# Patient Record
Sex: MALE | URBAN - METROPOLITAN AREA
[De-identification: names, ages, dates, MRNs, and addresses within clinical notes are randomized per-mention and may not be internally consistent; named-entity substitution may affect disease eponyms.]

---

## 2017-02-01 NOTE — PATIENT DISCUSSION
1.  Transplant Doing well after transplant. Continue topical steroids. Rejection and vaccinations reviewed. 2. Pseudophakia OU - IOLs stable. Monitor. 3. ARMD OU dry - Importance of smoking cessation blood pressure control and healthy diet were emphasized. In accordance with the AREDS study a good multivitamin containing EC and Zinc were recommened to be taken daily. Patient was instructed to self monitor their monocular vision (reading/Amsler Grid) at least weekly. Patient should immediately report any new onset of decreased vision or metamorphopsia. 4. Return for an appointment in 9 months for comprehensive exam. with Dr. Monty Frazier.

## 2018-02-13 NOTE — PATIENT DISCUSSION
1.  ARMD OU dry - Importance of smoking cessation blood pressure control and healthy diet were emphasized. In accordance with the AREDS study a good multivitamin containing EC and Zinc were recommened to be taken daily. Patient was instructed to self monitor their monocular vision (reading/Amsler Grid) at least weekly. Patient should immediately report any new onset of decreased vision or metamorphopsia. 2. Pseudophakia OU - IOLs stable. Monitor. 3. Transplant Doing well after DSEK OU. Continue PF QD. Rejection and vaccinations reviewed. 4. Dry Eye OU:  Continue current management with Restasis. 5.  Refractive error - Change glasses. 6.  Return for an appointment in 6 months for office call and pressure check. Optos with Dr. Alana Winters.

## 2018-09-14 NOTE — PATIENT DISCUSSION
1.  Transplant Doing well after transplant. Continue topical steroids. Rejection and vaccinations reviewed. 2. Pseudophakia OU - IOLs stable. Monitor. 3. ARMD OU dry - Importance of smoking cessation blood pressure control and healthy diet were emphasized. In accordance with the AREDS study a good multivitamin containing EC and Zinc were recommened to be taken daily. Patient was instructed to self monitor their monocular vision (reading/Amsler Grid) at least weekly. Patient should immediately report any new onset of decreased vision or metamorphopsia. Return for an appointment in 9 months for comprehensive exam. with Dr. Cherelle Edge.

## 2018-11-12 ENCOUNTER — IMPORTED ENCOUNTER (OUTPATIENT)
Dept: URBAN - METROPOLITAN AREA CLINIC 9 | Facility: CLINIC | Age: 53
End: 2018-11-12

## 2019-04-03 NOTE — PATIENT DISCUSSION
1.  Transplant Doing well after transplant. Continue topical steroids. Rejection and vaccinations reviewed. 2. Pseudophakia OU - IOLs stable. Monitor. 3. PVD OU:  Patient was cautioned to call our office immediately if they experience a substantial change in their symptoms such as an increase in floaters persistent flashes loss of visual field (may appear as a shadow or a curtain) or decrease in visual acuity as these may indicate a retinal tear or detachment. If this is a new problem patient will need to return for re-examination  as determined by the [facility] 10. 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 According to the patient and/or compared to old photographs of the patient the condition is old and stable. Therefore observation was recommended. 5. Dry Eye OU:  Start Restasis BID. Discussed that Restasis helps to increase the production of the patient's own tears and therefore can take 3-4 months for an improvement in symptoms. Use AT prn.

## 2019-04-03 NOTE — PATIENT DISCUSSION
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 According to the patient and/or compared to old photographs of the patient the condition is old and stable. Therefore observation was recommended.

## 2020-02-11 NOTE — PATIENT DISCUSSION
1.  Pseudophakia OU - IOLs stable. Monitorfor changes in vision. 2. Dry Eye OU:  Continue current management with Restasis. 3.  Transplant Doing well after transplant. Continue topical steroids. Rejection and vaccinations reviewed. 4. 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 According to the patient and/or compared to old photographs of the patient the condition is old and stable. Therefore observation was recommended. CE 1 year

## 2021-04-14 NOTE — PATIENT DISCUSSION
1.  ARMD OU dry - mild stable monitor. 2. DSEK stable OU -  Rejection and vaccinations reviewed. 3. Pseudophakia OU - IOLs stable. Monitor for changes in vision. 4. Refractive error - recommend SV reading glasses with 3 BI prism OU. No change to her present TFs.5.  Keratoconjunctivitis Sicca OU:  Continue current management with Restasis BID OU and can use Refresh Relieva or Thgera Tears Plus Extra. Has not found Systane very beneficial.  6.  Return for an appointment in 1 year for comprehensive exam. OCT Macula. with Dr. Chris Pace.

## 2021-04-14 NOTE — PATIENT DISCUSSION
1.  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 Has to close OD to read comfortable. Reading rx with prism. 2. ARMD OU dry - mild stable monitor. 3. DSEK stable OU -  Rejection and vaccinations reviewed. 4. Pseudophakia OU - IOLs stable. Monitor for changes in vision. 5. Refractive error - recommend SV reading glasses with 3 BI prism OU. No change to her present TFs. 6.  Keratoconjunctivitis Sicca OU:  Continue current management with Restasis BID OU and can use Refresh Relieva or Thgera Tears Plus Extra. Has not found Systane very beneficial.  7.  Return for an appointment in 1 year for comprehensive exam. OCT Macula. with Dr. Tatiana Pascual.

## 2021-08-07 NOTE — PATIENT DISCUSSION
1.  ARMD OU dry - Importance of smoking cessation blood pressure control and healthy diet were emphasized. In accordance with the AREDS study a good multivitamin containing EC and Zinc were recommened to be taken daily. Patient was instructed to self monitor their monocular vision (reading/Amsler Grid) at least weekly. Patient should immediately report any new onset of decreased vision or metamorphopsia. Cannot rule out a subtle CNV due to decreased VA and few small hemorrhages OD2. Pseudophakia OU - IOLs stable. Monitor for changes in vision.

## 2021-08-09 NOTE — PROCEDURE NOTE: CLINICAL
PROCEDURE NOTE: Lucentis 0.5mg PFS OD. Diagnosis: Branch Retinal Vein Occlusion with Macular Edema. Anesthesia: Topical. Prep: Betadine Flush. Prior to injection, risks/benefits/alternatives discussed including but not limited to infection, loss of vision or eye, hemorrhage, cataract, glaucoma, retinal tears or detachment. The patient wished to proceed with treatment. Topical anesthesia was induced with Alcaine. Additional anesthesia was achieved using drop(s) or injection checked above. A drop of Povidone-iodine 5% ophthalmic solution was instilled over the injection site and in the inferior fornix. Betadine prep was performed. A single use prefilled syringe of intravitreal Lucentis 0.5mg/0.05ml was used and excess was disposed of as waste. The needle was passed 3.0 mm posterior to the limbus in pseudophakic patients, and 3.5 mm posterior to the limbus in phakic patients. Injection Time 2:00PM. Patient tolerated the procedure well. There were no complications. The eye was irrigated with sterile irrigating solution. Post procedure instructions given. The patient was instructed to use Artificial Tears q.i.d. p.r.n for comfort. The patient was instructed to return for re-evaluation in approximately 4-12 weeks depending on his/her condition and was told to call immediately if vision decreases and/or if his/her eye becomes red, painful, and/or light sensitive. The patient was instructed to go to the emergency room or call 911 if unable to reach the doctor within an hour or two of trying or calling. Hunter Brownlee

## 2021-08-09 NOTE — PATIENT DISCUSSION
NEW 8 9 21 MILD EDEMA - TREATMENT TRIAL - UNABLE TO GET FA AS SHE COULD NOT GET UP TO THE IMAGING EQUIPMENT - IF NO IMPROVEMENT CONSIDER ALTERNATIVE TREATMENT.

## 2021-09-08 NOTE — PROCEDURE NOTE: CLINICAL
PROCEDURE NOTE: Lucentis 0.5mg PFS OD. Diagnosis: Branch Retinal Vein Occlusion with Macular Edema. Anesthesia: Topical. Prep: Betadine Flush. Prior to injection, risks/benefits/alternatives discussed including but not limited to infection, loss of vision or eye, hemorrhage, cataract, glaucoma, retinal tears or detachment. The patient wished to proceed with treatment. Topical anesthesia was induced with Alcaine. Additional anesthesia was achieved using drop(s) or injection checked above. A drop of Povidone-iodine 5% ophthalmic solution was instilled over the injection site and in the inferior fornix. Betadine prep was performed. A single use prefilled syringe of intravitreal Lucentis 0.5mg/0.05ml was used and excess was disposed of as waste. The needle was passed 3.0 mm posterior to the limbus in pseudophakic patients, and 3.5 mm posterior to the limbus in phakic patients. Injection Time 2:45 PM. Patient tolerated the procedure well. There were no complications. The eye was irrigated with sterile irrigating solution. Post procedure instructions given. The patient was instructed to use Artificial Tears q.i.d. p.r.n for comfort. The patient was instructed to return for re-evaluation in approximately 4-12 weeks depending on his/her condition and was told to call immediately if vision decreases and/or if his/her eye becomes red, painful, and/or light sensitive. The patient was instructed to go to the emergency room or call 911 if unable to reach the doctor within an hour or two of trying or calling. Glenn Herring

## 2021-10-07 NOTE — PROCEDURE NOTE: CLINICAL
PROCEDURE NOTE: Lucentis 0.5mg PFS OD. Diagnosis: Branch Retinal Vein Occlusion with Macular Edema. Anesthesia: Lidocaine 4%. Prep: Betadine Flush. Prior to injection, risks/benefits/alternatives discussed including but not limited to infection, loss of vision or eye, hemorrhage, cataract, glaucoma, retinal tears or detachment. The patient wished to proceed with treatment. Topical anesthesia was induced with Alcaine. Additional anesthesia was achieved using drop(s) or injection checked above. A drop of Povidone-iodine 5% ophthalmic solution was instilled over the injection site and in the inferior fornix. Betadine prep was performed. A single use prefilled syringe of intravitreal Lucentis 0.5mg/0.05ml was used and excess was disposed of as waste. The needle was passed 3.0 mm posterior to the limbus in pseudophakic patients, and 3.5 mm posterior to the limbus in phakic patients. Injection Time 2:13PM. Patient tolerated the procedure well. There were no complications. The eye was irrigated with sterile irrigating solution. Post procedure instructions given. The patient was instructed to use Artificial Tears q.i.d. p.r.n for comfort. The patient was instructed to return for re-evaluation in approximately 4-12 weeks depending on his/her condition and was told to call immediately if vision decreases and/or if his/her eye becomes red, painful, and/or light sensitive. The patient was instructed to go to the emergency room or call 911 if unable to reach the doctor within an hour or two of trying or calling. Babs Luis

## 2021-10-16 ASSESSMENT — KERATOMETRY
OS_K2POWER_DIOPTERS: 43.75
OD_AXISANGLE2_DEGREES: 86
OS_K1POWER_DIOPTERS: 43.25
OS_AXISANGLE_DEGREES: 37
OS_AXISANGLE2_DEGREES: 127
OD_K2POWER_DIOPTERS: 43.5
OD_K1POWER_DIOPTERS: 43
OD_AXISANGLE_DEGREES: 176

## 2021-10-16 ASSESSMENT — VISUAL ACUITY
OS_SC: 20/50 + SN
OD_SC: 20/50 - SN
OD_CC: 20/20 -2 SN
OS_CC: 20/25 SN
OD_CC: 20/20 SN
OS_CC: 20/20 SN

## 2021-10-16 ASSESSMENT — TONOMETRY
OD_IOP_MMHG: 19
OS_IOP_MMHG: 18

## 2021-11-18 NOTE — PROCEDURE NOTE: CLINICAL
PROCEDURE NOTE: Lucentis 0.5mg PFS OD. Diagnosis: Branch Retinal Vein Occlusion with Macular Edema. Anesthesia: Lidocaine 4%. Prep: Betadine Flush. Prior to injection, risks/benefits/alternatives discussed including but not limited to infection, loss of vision or eye, hemorrhage, cataract, glaucoma, retinal tears or detachment. The patient wished to proceed with treatment. Topical anesthesia was induced with Alcaine. Additional anesthesia was achieved using drop(s) or injection checked above. A drop of Povidone-iodine 5% ophthalmic solution was instilled over the injection site and in the inferior fornix. Betadine prep was performed. A single use prefilled syringe of intravitreal Lucentis 0.5mg/0.05ml was used and excess was disposed of as waste. The needle was passed 3.0 mm posterior to the limbus in pseudophakic patients, and 3.5 mm posterior to the limbus in phakic patients. Injection Time 2:00PM. Patient tolerated the procedure well. There were no complications. The eye was irrigated with sterile irrigating solution. Post procedure instructions given. The patient was instructed to use Artificial Tears q.i.d. p.r.n for comfort. The patient was instructed to return for re-evaluation in approximately 4-12 weeks depending on his/her condition and was told to call immediately if vision decreases and/or if his/her eye becomes red, painful, and/or light sensitive. The patient was instructed to go to the emergency room or call 911 if unable to reach the doctor within an hour or two of trying or calling. INVELTYS GIVEN. Hunter Brownlee

## 2022-01-07 NOTE — PROCEDURE NOTE: CLINICAL
PROCEDURE NOTE: Lucentis 0.5mg PFS OD. Diagnosis: Branch Retinal Vein Occlusion with Macular Edema. Anesthesia: Lidocaine. Prep: Betadine Flush. Prior to injection, risks/benefits/alternatives discussed including but not limited to infection, loss of vision or eye, hemorrhage, cataract, glaucoma, retinal tears or detachment. The patient wished to proceed with treatment. Topical anesthesia was induced with Alcaine. Additional anesthesia was achieved using drop(s) or injection checked above. A drop of Povidone-iodine 5% ophthalmic solution was instilled over the injection site and in the inferior fornix. Betadine prep was performed. A single use prefilled syringe of intravitreal Lucentis 0.5mg/0.05ml was used and excess was disposed of as waste. The needle was passed 3.0 mm posterior to the limbus in pseudophakic patients, and 3.5 mm posterior to the limbus in phakic patients. Injection Time 2:30PM. Patient tolerated the procedure well. There were no complications. The eye was irrigated with sterile irrigating solution. Post procedure instructions given. The patient was instructed to use Artificial Tears q.i.d. p.r.n for comfort. The patient was instructed to return for re-evaluation in approximately 4-12 weeks depending on his/her condition and was told to call immediately if vision decreases and/or if his/her eye becomes red, painful, and/or light sensitive. The patient was instructed to go to the emergency room or call 911 if unable to reach the doctor within an hour or two of trying or calling. Gregg Rivers

## 2022-03-08 NOTE — PATIENT DISCUSSION
3 8 22 NO CHANGES ON IMAGES - NO SIGN ON INVOLVEMENT - PT TO HAVE TX AND IF NO IMPROVMENT TO RETURN FOR FULL EXAM W/IN 1 WK.

## 2022-03-08 NOTE — PROCEDURE NOTE: CLINICAL
PROCEDURE NOTE: Lucentis 0.5mg PFS OD. Diagnosis: Branch Retinal Vein Occlusion with Macular Edema. Anesthesia: Lidocaine. Prep: Betadine Flush. Prior to injection, risks/benefits/alternatives discussed including but not limited to infection, loss of vision or eye, hemorrhage, cataract, glaucoma, retinal tears or detachment. The patient wished to proceed with treatment. Topical anesthesia was induced with Alcaine. Additional anesthesia was achieved using drop(s) or injection checked above. A drop of Povidone-iodine 5% ophthalmic solution was instilled over the injection site and in the inferior fornix. Betadine prep was performed. A single use prefilled syringe of intravitreal Lucentis 0.5mg/0.05ml was used and excess was disposed of as waste. The needle was passed 3.0 mm posterior to the limbus in pseudophakic patients, and 3.5 mm posterior to the limbus in phakic patients. Injection Time *. Patient tolerated the procedure well. There were no complications. The eye was irrigated with sterile irrigating solution. Post procedure instructions given. The patient was instructed to use Artificial Tears q.i.d. p.r.n for comfort. The patient was instructed to return for re-evaluation in approximately 4-12 weeks depending on his/her condition and was told to call immediately if vision decreases and/or if his/her eye becomes red, painful, and/or light sensitive. The patient was instructed to go to the emergency room or call 911 if unable to reach the doctor within an hour or two of trying or calling. Tracy Calzada

## 2022-04-20 NOTE — PATIENT DISCUSSION
Last sed rate normal 3/17/22, CRP elevated at that time. Pred increased and is following up with Dr. Hector Kirkpatrick.

## 2022-05-10 NOTE — PROCEDURE NOTE: CLINICAL
PROCEDURE NOTE: Lucentis 0.5mg PFS OD. Diagnosis: Branch Retinal Vein Occlusion with Macular Edema. Anesthesia: Lidocaine 4%. Prep: Betadine Flush. Prior to injection, risks/benefits/alternatives discussed including but not limited to infection, loss of vision or eye, hemorrhage, cataract, glaucoma, retinal tears or detachment. The patient wished to proceed with treatment. Topical anesthesia was induced with Alcaine. Additional anesthesia was achieved using drop(s) or injection checked above. A drop of Povidone-iodine 5% ophthalmic solution was instilled over the injection site and in the inferior fornix. Betadine prep was performed. A single use prefilled syringe of intravitreal Lucentis 0.5mg/0.05ml was used and excess was disposed of as waste. The needle was passed 3.0 mm posterior to the limbus in pseudophakic patients, and 3.5 mm posterior to the limbus in phakic patients. Injection Time 2:20PM. Patient tolerated the procedure well. There were no complications. The eye was irrigated with sterile irrigating solution. Post procedure instructions given. The patient was instructed to use Artificial Tears q.i.d. p.r.n for comfort. The patient was instructed to return for re-evaluation in approximately 4-12 weeks depending on his/her condition and was told to call immediately if vision decreases and/or if his/her eye becomes red, painful, and/or light sensitive. The patient was instructed to go to the emergency room or call 911 if unable to reach the doctor within an hour or two of trying or calling. Gabriel Grant

## 2022-07-19 NOTE — PROCEDURE NOTE: CLINICAL
PROCEDURE NOTE: Lucentis 0.5mg PFS OD. Diagnosis: Branch Retinal Vein Occlusion with Macular Edema. Anesthesia: Lidocaine. Prep: Betadine Flush. Prior to injection, risks/benefits/alternatives discussed including but not limited to infection, loss of vision or eye, hemorrhage, cataract, glaucoma, retinal tears or detachment. The patient wished to proceed with treatment. Topical anesthesia was induced with Alcaine. Additional anesthesia was achieved using drop(s) or injection checked above. A drop of Povidone-iodine 5% ophthalmic solution was instilled over the injection site and in the inferior fornix. Betadine prep was performed. A single use prefilled syringe of intravitreal Lucentis 0.5mg/0.05ml was used and excess was disposed of as waste. The needle was passed 3.0 mm posterior to the limbus in pseudophakic patients, and 3.5 mm posterior to the limbus in phakic patients. Injection Time 4:00PM. Patient tolerated the procedure well. There were no complications. The eye was irrigated with sterile irrigating solution. Post procedure instructions given. The patient was instructed to use Artificial Tears q.i.d. p.r.n for comfort. The patient was instructed to return for re-evaluation in approximately 4-12 weeks depending on his/her condition and was told to call immediately if vision decreases and/or if his/her eye becomes red, painful, and/or light sensitive. The patient was instructed to go to the emergency room or call 911 if unable to reach the doctor within an hour or two of trying or calling. Gabriel Grant

## 2022-08-26 NOTE — PATIENT DISCUSSION
ARTIFICIAL TEARS to affected eye(s) as needed. [facility]  with a PA request for the Methylphenidate 18 mg ER tabs  Initiated the Methylphenidate 18 mg ER tab PA via [employer] for [facility], but must return to [employer] to retrieve the clinical questions  Will check back later today  For your review

## 2022-11-10 NOTE — PATIENT DISCUSSION
To try FT 28. May be an issue because of head positioning. . May need separate and near if unsuccessful.

## 2022-11-29 NOTE — PROCEDURE NOTE: CLINICAL
PROCEDURE NOTE: Lucentis 0.5mg PFS OD. Diagnosis: Branch Retinal Vein Occlusion with Macular Edema. Anesthesia: Lidocaine 4%. Prep: Betadine Flush. Prior to injection, risks/benefits/alternatives discussed including but not limited to infection, loss of vision or eye, hemorrhage, cataract, glaucoma, retinal tears or detachment. The patient wished to proceed with treatment. Topical anesthesia was induced with Alcaine. Additional anesthesia was achieved using drop(s) or injection checked above. A drop of Povidone-iodine 5% ophthalmic solution was instilled over the injection site and in the inferior fornix. Betadine prep was performed. A single use prefilled syringe of intravitreal Lucentis 0.5mg/0.05ml was used and excess was disposed of as waste. The needle was passed 3.0 mm posterior to the limbus in pseudophakic patients, and 3.5 mm posterior to the limbus in phakic patients. Injection Time 12:55PM. Patient tolerated the procedure well. There were no complications. The eye was irrigated with sterile irrigating solution. Post procedure instructions given. The patient was instructed to use Artificial Tears q.i.d. p.r.n for comfort. The patient was instructed to return for re-evaluation in approximately 4-12 weeks depending on his/her condition and was told to call immediately if vision decreases and/or if his/her eye becomes red, painful, and/or light sensitive. The patient was instructed to go to the emergency room or call 911 if unable to reach the doctor within an hour or two of trying or calling. Babs Luis

## 2025-01-02 NOTE — PATIENT DISCUSSION
Recommended OBSERVATION and MONITORING for change. [Normal Oropharynx] : normal oropharynx [Normal Appearance] : normal appearance [No Neck Mass] : no neck mass [Normal Rate/Rhythm] : normal rate/rhythm [Normal S1, S2] : normal s1, s2 [No Murmurs] : no murmurs [No Abnormalities] : no abnormalities [Benign] : benign [No Edema] : no edema [FROM] : FROM [No Focal Deficits] : no focal deficits [Oriented x3] : oriented x3 [Normal Affect] : normal affect [TextBox_2] : Breathless during exam [TextBox_68] : Bilateral wheezing throughout